# Patient Record
Sex: MALE | Race: WHITE | NOT HISPANIC OR LATINO | Employment: UNEMPLOYED | URBAN - METROPOLITAN AREA
[De-identification: names, ages, dates, MRNs, and addresses within clinical notes are randomized per-mention and may not be internally consistent; named-entity substitution may affect disease eponyms.]

---

## 2018-05-16 ENCOUNTER — OFFICE VISIT (OUTPATIENT)
Dept: FAMILY MEDICINE CLINIC | Facility: CLINIC | Age: 33
End: 2018-05-16

## 2018-05-16 VITALS
WEIGHT: 222.7 LBS | DIASTOLIC BLOOD PRESSURE: 86 MMHG | HEART RATE: 87 BPM | TEMPERATURE: 98 F | OXYGEN SATURATION: 98 % | HEIGHT: 69 IN | SYSTOLIC BLOOD PRESSURE: 122 MMHG | BODY MASS INDEX: 32.98 KG/M2

## 2018-05-16 DIAGNOSIS — Z13.0 SCREENING FOR DEFICIENCY ANEMIA: ICD-10-CM

## 2018-05-16 DIAGNOSIS — Z13.1 SCREENING FOR DIABETES MELLITUS: ICD-10-CM

## 2018-05-16 DIAGNOSIS — M54.50 MIDLINE LOW BACK PAIN WITHOUT SCIATICA, UNSPECIFIED CHRONICITY: Primary | ICD-10-CM

## 2018-05-16 DIAGNOSIS — S39.012A LOW BACK STRAIN, INITIAL ENCOUNTER: ICD-10-CM

## 2018-05-16 DIAGNOSIS — Z13.220 SCREENING FOR HYPERLIPIDEMIA: ICD-10-CM

## 2018-05-16 PROCEDURE — 99204 OFFICE O/P NEW MOD 45 MIN: CPT | Performed by: NURSE PRACTITIONER

## 2018-05-16 PROCEDURE — 96372 THER/PROPH/DIAG INJ SC/IM: CPT | Performed by: NURSE PRACTITIONER

## 2018-05-16 RX ORDER — IBUPROFEN 200 MG
200 TABLET ORAL EVERY 6 HOURS PRN
COMMUNITY

## 2018-05-16 RX ORDER — METAXALONE 800 MG/1
800 TABLET ORAL 4 TIMES DAILY
Qty: 56 TABLET | Refills: 1 | Status: SHIPPED | OUTPATIENT
Start: 2018-05-16

## 2018-05-16 RX ORDER — PREDNISONE 10 MG/1
TABLET ORAL
Qty: 39 TABLET | Refills: 0 | Status: SHIPPED | OUTPATIENT
Start: 2018-05-16 | End: 2018-06-11

## 2018-05-16 RX ADMIN — METHYLPREDNISOLONE SODIUM SUCCINATE 40 MG: 40 INJECTION, POWDER, LYOPHILIZED, FOR SOLUTION INTRAMUSCULAR; INTRAVENOUS at 15:34

## 2018-05-17 LAB
ALBUMIN SERPL-MCNC: 4.8 G/DL (ref 3.5–5.5)
ALBUMIN/GLOB SERPL: 1.8 {RATIO} (ref 1.2–2.2)
ALP SERPL-CCNC: 65 IU/L (ref 39–117)
ALT SERPL-CCNC: 30 IU/L (ref 0–44)
AST SERPL-CCNC: 30 IU/L (ref 0–40)
BILIRUB SERPL-MCNC: 0.3 MG/DL (ref 0–1.2)
BUN SERPL-MCNC: 15 MG/DL (ref 6–20)
BUN/CREAT SERPL: 15 (ref 9–20)
CALCIUM SERPL-MCNC: 10.2 MG/DL (ref 8.7–10.2)
CHLORIDE SERPL-SCNC: 100 MMOL/L (ref 96–106)
CHOLEST SERPL-MCNC: 195 MG/DL (ref 100–199)
CHOLEST/HDLC SERPL: 5.1 RATIO (ref 0–5)
CO2 SERPL-SCNC: 23 MMOL/L (ref 18–29)
CREAT SERPL-MCNC: 1 MG/DL (ref 0.76–1.27)
ERYTHROCYTE [DISTWIDTH] IN BLOOD BY AUTOMATED COUNT: 13.7 % (ref 12.3–15.4)
GFR SERPLBLD CREATININE-BSD FMLA CKD-EPI: 114 ML/MIN/1.73
GFR SERPLBLD CREATININE-BSD FMLA CKD-EPI: 98 ML/MIN/1.73
GLOBULIN SER CALC-MCNC: 2.7 G/DL (ref 1.5–4.5)
GLUCOSE SERPL-MCNC: 91 MG/DL (ref 65–99)
HCT VFR BLD AUTO: 45.4 % (ref 37.5–51)
HDLC SERPL-MCNC: 38 MG/DL
HGB BLD-MCNC: 15.9 G/DL (ref 13–17.7)
LDLC SERPL CALC-MCNC: 104 MG/DL (ref 0–99)
MCH RBC QN AUTO: 30.3 PG (ref 26.6–33)
MCHC RBC AUTO-ENTMCNC: 35 G/DL (ref 31.5–35.7)
MCV RBC AUTO: 87 FL (ref 79–97)
PLATELET # BLD AUTO: 228 X10E3/UL (ref 150–379)
POTASSIUM SERPL-SCNC: 4.5 MMOL/L (ref 3.5–5.2)
PROT SERPL-MCNC: 7.5 G/DL (ref 6–8.5)
RBC # BLD AUTO: 5.24 X10E6/UL (ref 4.14–5.8)
SODIUM SERPL-SCNC: 142 MMOL/L (ref 134–144)
TRIGL SERPL-MCNC: 264 MG/DL (ref 0–149)
VLDLC SERPL CALC-MCNC: 53 MG/DL (ref 5–40)
WBC # BLD AUTO: 6.9 X10E3/UL (ref 3.4–10.8)

## 2018-05-17 RX ORDER — METHYLPREDNISOLONE SODIUM SUCCINATE 40 MG/ML
40 INJECTION, POWDER, LYOPHILIZED, FOR SOLUTION INTRAMUSCULAR; INTRAVENOUS ONCE
Status: COMPLETED | OUTPATIENT
Start: 2018-05-17 | End: 2018-05-16

## 2018-05-17 NOTE — PROGRESS NOTES
Subjective   Blayne Frye is a 32 y.o. male.     Back Pain (NP- lower- x months- no known injury)     Mr. Frye presents today to establish care at this practice. His chief complaint today is that he is having lower back pain x 1 month that has been worsening over the past 2 weeks. He has not been able to work for the past 2 weeks because of the pain. He reports the pain as a 9/10 and it is worse at night. He is unable to sit or stand for long periods of time. He denies injury that would cause his pain and the pain does not radiate.  He went to [facility] in [place], [place] two weeks ago and was seen in the ER. He states he was sent home on Ibuprofen and Robaxin which did not help.     He report no significant medical history. He reports he has not had a PCP during his adult life. He smokes 1 PPD cigarettes x 15 years and he drinks at least 1 can of beer a week.     I have reviewed the patient's medical history in detail and updated the computerized patient record.     The following portions of the patient's history were reviewed and updated as appropriate: allergies, current medications, past family history, past medical history, past social history, past surgical history and problem list.     No current outpatient prescriptions on file prior to visit.     No current facility-administered medications on file prior to visit.        Review of Systems   Constitutional: Negative.    Respiratory: Negative.    Cardiovascular: Negative.    Musculoskeletal: Positive for back pain (sever lower back pain x 1 month, does not radiate). Negative for gait problem, joint swelling and neck pain.   Neurological: Negative.  Negative for weakness and numbness.       Objective    Vitals:    05/16/18 1127   BP: 122/86   Pulse: 87   Temp: 98 °F (36.7 °C)   SpO2: 98%       Physical Exam   Constitutional: He is oriented to person, place, and time. He appears well-developed and well-nourished.   Neck: Normal range of motion. Neck  supple. No JVD present. No tracheal deviation present. No thyromegaly present.   Cardiovascular: Normal rate, regular rhythm and normal heart sounds.    Pulmonary/Chest: Effort normal and breath sounds normal.   Musculoskeletal: He exhibits no edema.        Lumbar back: He exhibits pain and spasm. He exhibits normal range of motion, no swelling and no edema.   Lymphadenopathy:     He has no cervical adenopathy.   Neurological: He is alert and oriented to person, place, and time.   Skin: Skin is warm and dry.   Psychiatric: He has a normal mood and affect. His behavior is normal. Judgment and thought content normal.   Vitals reviewed.        Assessment/Plan   Blayne was seen today for back pain.    Diagnoses and all orders for this visit:    Midline low back pain without sciatica, unspecified chronicity  -     Cancel: XR Spine Lumbar 2 or 3 View  -     metaxalone (SKELAXIN) 800 MG tablet; Take 1 tablet by mouth 4 (Four) Times a Day.  -     predniSONE (DELTASONE) 10 MG tablet; 3 tabs twice a day for 3 days, 2 tabs twice a day for 3 days, 1 tab twice a day for 3 days and 1 tab daily for 3 days  -     Ambulatory Referral to Physical Therapy Evaluate and treat    Low back strain, initial encounter  -     metaxalone (SKELAXIN) 800 MG tablet; Take 1 tablet by mouth 4 (Four) Times a Day.  -     predniSONE (DELTASONE) 10 MG tablet; 3 tabs twice a day for 3 days, 2 tabs twice a day for 3 days, 1 tab twice a day for 3 days and 1 tab daily for 3 days  -     Ambulatory Referral to Physical Therapy Evaluate and treat    Screening for deficiency anemia  -     CBC (No Diff)    Screening for diabetes mellitus  -     Comprehensive Metabolic Panel    Screening for hyperlipidemia  -     Lipid Panel With / Chol / HDL Ratio    1. I have asked for his ER visit notes form [facility] for review. He states he had imaging done at that visit so I will defer from doing imaging today until I get those notes.  2. I am sending him to  physical therapy here in [facility] for evaluation and treatment of lower back pain.   3. He has stopped taking the Robaxin. He is to start Skelaxin 800 mg four times a day as needed. He is also to start Prednisone 10 mg twice a day as instructed. He has been advised not to take Ibuprofen or Aleve while taking the prednisone. He may take Tylenol for the pain.   3. I have ordered the above fasting labs to be done since Mr. Frye has not seen a medical provider since he was a child other than at the ER or .  4. He is to follow up as needed or in 1 year.

## 2018-05-21 DIAGNOSIS — E78.5 HYPERLIPIDEMIA, UNSPECIFIED HYPERLIPIDEMIA TYPE: Primary | ICD-10-CM

## 2018-05-21 RX ORDER — ATORVASTATIN CALCIUM 20 MG/1
20 TABLET, FILM COATED ORAL DAILY
Qty: 30 TABLET | Refills: 5 | Status: SHIPPED | OUTPATIENT
Start: 2018-05-21

## 2018-05-21 NOTE — PROGRESS NOTES
Please call the patient regarding his abnormal result. I have sent in a prescription for generic Lipitor. Return in 6 months for follow up with fasting labs the week before

## 2018-06-11 ENCOUNTER — OFFICE VISIT (OUTPATIENT)
Dept: FAMILY MEDICINE CLINIC | Facility: CLINIC | Age: 33
End: 2018-06-11

## 2018-06-11 VITALS
BODY MASS INDEX: 32.97 KG/M2 | OXYGEN SATURATION: 98 % | HEIGHT: 69 IN | DIASTOLIC BLOOD PRESSURE: 80 MMHG | HEART RATE: 102 BPM | WEIGHT: 222.6 LBS | SYSTOLIC BLOOD PRESSURE: 110 MMHG | TEMPERATURE: 98.7 F

## 2018-06-11 DIAGNOSIS — M54.50 LOW BACK PAIN WITHOUT SCIATICA, UNSPECIFIED BACK PAIN LATERALITY, UNSPECIFIED CHRONICITY: Primary | ICD-10-CM

## 2018-06-11 PROCEDURE — 99213 OFFICE O/P EST LOW 20 MIN: CPT | Performed by: NURSE PRACTITIONER

## 2018-06-11 NOTE — PROGRESS NOTES
Subjective   Blayne Frye is a 32 y.o. male.     Mr. Frye presents today with complaints of back pain. I had seen him on 5/16/18 for this same issue. He was a new patient to me. He had been seen prior to seeing me at the [facility] ER in [facility] for the back pain. At that time he was given Ibuprofen and Robaxin for his muscle pain. At the time that I first saw him the back pain had been going on for a month. He had stopped taking the robaxin prior to seeing me, so I had prescribed Skellaxin instead and referred him to physical therapy.         The following portions of the patient's history were reviewed and updated as appropriate: allergies, current medications, past family history, past medical history, past social history, past surgical history and problem list.       Current Outpatient Prescriptions:   •  atorvastatin (LIPITOR) 20 MG tablet, Take 1 tablet by mouth Daily., Disp: 30 tablet, Rfl: 5  •  ibuprofen (ADVIL,MOTRIN) 200 MG tablet, Take 200 mg by mouth Every 6 (Six) Hours As Needed for Mild Pain ., Disp: , Rfl:   •  metaxalone (SKELAXIN) 800 MG tablet, Take 1 tablet by mouth 4 (Four) Times a Day., Disp: 56 tablet, Rfl: 1    Review of Systems   Constitutional: Negative.    Musculoskeletal: Positive for back pain.   Neurological: Negative for weakness and numbness.       Objective    Vitals:    06/11/18 1407   BP: 110/80   Pulse: 102   Temp: 98.7 °F (37.1 °C)   SpO2: 98%     Physical Exam   Constitutional: He is oriented to person, place, and time. He appears well-developed and well-nourished.   Musculoskeletal:        Lumbar back: He exhibits normal range of motion, no pain and no spasm.   Neurological: He is alert and oriented to person, place, and time.   Skin: Skin is warm and dry.   Psychiatric:   No acute distress   Vitals reviewed.      Assessment/Plan   Blayne was seen today for back pain.    Diagnoses and all orders for this visit:    Low back pain without sciatica, unspecified back pain  laterality, unspecified chronicity     1. Mr. Frye was seen today because his back pain has not resolved. I asked him if he was going to physical therapy and he said he had been to 3 visits over the past 3 weeks.   2. I have reviewed the physical therapy notes with him to return to full functions after 4 weeks of therapy with 2 visits per week.   3. I have discussed with him that he will have to complete his physical therapy and do what is asked of him to see any improvement in his back pain.  4. Mr. Frye has presented me with papers to be filled out for disability services. I will have to further evaluate his physical condition to see if this is medically necessary.

## 2018-11-01 ENCOUNTER — RESULTS ENCOUNTER (OUTPATIENT)
Dept: FAMILY MEDICINE CLINIC | Facility: CLINIC | Age: 33
End: 2018-11-01

## 2018-11-01 DIAGNOSIS — E78.5 HYPERLIPIDEMIA, UNSPECIFIED HYPERLIPIDEMIA TYPE: ICD-10-CM
